# Patient Record
Sex: MALE | Race: WHITE | NOT HISPANIC OR LATINO | Employment: FULL TIME | URBAN - METROPOLITAN AREA
[De-identification: names, ages, dates, MRNs, and addresses within clinical notes are randomized per-mention and may not be internally consistent; named-entity substitution may affect disease eponyms.]

---

## 2018-04-12 ENCOUNTER — OFFICE VISIT (OUTPATIENT)
Dept: SLEEP MEDICINE | Facility: HOSPITAL | Age: 38
End: 2018-04-12
Attending: INTERNAL MEDICINE

## 2018-04-12 VITALS
DIASTOLIC BLOOD PRESSURE: 82 MMHG | TEMPERATURE: 97 F | SYSTOLIC BLOOD PRESSURE: 133 MMHG | BODY MASS INDEX: 30.29 KG/M2 | OXYGEN SATURATION: 97 % | WEIGHT: 236 LBS | HEART RATE: 75 BPM | HEIGHT: 74 IN

## 2018-04-12 DIAGNOSIS — G47.10 HYPERSOMNIA WITH SLEEP APNEA: Primary | ICD-10-CM

## 2018-04-12 DIAGNOSIS — G47.30 HYPERSOMNIA WITH SLEEP APNEA: Primary | ICD-10-CM

## 2018-04-12 PROCEDURE — G0463 HOSPITAL OUTPT CLINIC VISIT: HCPCS

## 2018-04-12 PROCEDURE — 99214 OFFICE O/P EST MOD 30 MIN: CPT | Performed by: INTERNAL MEDICINE

## 2018-04-12 NOTE — PROGRESS NOTES
"[facility] New [facility]Consultation       Reason for Consultation: EUGENE    Patient Care Team:  No Known Provider as PCP - General  Angel Pfeiffer MD as Consulting Physician (Sleep Medicine)    Chief complaint:  EUGENE    History of present illness:  Thank you for asking me to see your patient.  The patient is a 37 y.o. male who has loud snoring and witnessed apnea for the last 4-5 years.  He goes to bed between 9 and 10 PM and awakens at 5:15 AM.  He is tired upon arising.  He has gained 20 pounds in the last several years.  He has complaints of hypersomnolence and his [place] Sleepiness Scale is borderline abnormal at 8.  He has awaking gasping for breath and awakened coughing.  He has morning headaches and also will awaken with a dry mouth.  Sometimes he has problems falling asleep and he has problems staying asleep with frequent awakenings.  His sleep is generally restless.    Review of Systems:  Recorded on the Sleep Questionnaire.  Unremarkable except for painful joints and swollen ankles toward the end of the day.    History:  History reviewed. No pertinent past medical history., History reviewed. No pertinent surgical history.,   Family History   Problem Relation Age of Onset   • Hypertension Father    • Cancer Father    • Sleep apnea Father     and   Social History   Substance Use Topics   • Smoking status: Never Smoker   • Smokeless tobacco: Never Used   • Alcohol use No      Comment: Occasional Drink Soccially       Social History:  He is a .  He has one soda a day.    Allergies:  Keflex [cephalexin]     Medication Review: He takes no medicines.    Vital Signs:    Vitals:    04/12/18 0939   BP: 133/82   BP Location: Left arm   Patient Position: Sitting   Pulse: 75   Temp: 97 °F (36.1 °C)   TempSrc: Oral   SpO2: 97%   Weight: 107 kg (236 lb)   Height: 188 cm (74\")      Body mass index is 30.3 kg/m².    Physical Exam:  [facility]ed on the [facility] Physical [facility]am Form and is " unremarkable except for:  A large tongue and uvula and moderate narrowing of the posterior pharyngeal opening and class II MP airway     Results Review:  Sleep log       Impression:   Hypersomnolence with loud snoring and witnessed apnea and awaking gasping for breath and awaking coughing; this is consistent with at least a moderate pretest probability of having EUGENE.    Plan:  Good sleep hygiene measures should be maintained.  Weight loss would be beneficial in this patient who is obese.    Pathophysiology of EUGENE described to the patient.  Cardiovascular complications of untreated EUGENE also reviewed.      After reviewing all, I would recommend obtaining a home sleep study.  This will be scheduled.     Thank you for requesting me to assist in this patient's care.    Angel Pfeiffer MD  04/12/18  9:56 AM

## 2018-04-14 PROBLEM — G47.10 HYPERSOMNIA WITH SLEEP APNEA: Status: ACTIVE | Noted: 2018-04-14

## 2018-04-14 PROBLEM — G47.30 HYPERSOMNIA WITH SLEEP APNEA: Status: ACTIVE | Noted: 2018-04-14

## 2018-04-20 ENCOUNTER — HOSPITAL ENCOUNTER (OUTPATIENT)
Dept: SLEEP MEDICINE | Facility: HOSPITAL | Age: 38
Discharge: HOME | End: 2018-04-20
Admitting: INTERNAL MEDICINE

## 2018-04-20 DIAGNOSIS — G47.30 HYPERSOMNIA WITH SLEEP APNEA: ICD-10-CM

## 2018-04-20 DIAGNOSIS — G47.10 HYPERSOMNIA WITH SLEEP APNEA: ICD-10-CM

## 2018-04-20 PROCEDURE — 95806 SLEEP STUDY UNATT&RESP EFFT: CPT

## 2018-04-20 PROCEDURE — 95806 SLEEP STUDY UNATT&RESP EFFT: CPT | Performed by: INTERNAL MEDICINE

## 2018-05-09 ENCOUNTER — TELEPHONE (OUTPATIENT)
Dept: SLEEP MEDICINE | Facility: HOSPITAL | Age: 38
End: 2018-05-09

## 2018-05-09 NOTE — TELEPHONE ENCOUNTER
Patient is to return call for results and to schedule follow up appointment. Sending order to [facility]